# Patient Record
Sex: FEMALE | Race: BLACK OR AFRICAN AMERICAN | Employment: FULL TIME | URBAN - METROPOLITAN AREA
[De-identification: names, ages, dates, MRNs, and addresses within clinical notes are randomized per-mention and may not be internally consistent; named-entity substitution may affect disease eponyms.]

---
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
## 2020-02-18 ENCOUNTER — OFFICE VISIT (OUTPATIENT)
Dept: OBGYN CLINIC | Age: 20
End: 2020-02-18

## 2020-02-18 VITALS
WEIGHT: 154 LBS | BODY MASS INDEX: 26.29 KG/M2 | SYSTOLIC BLOOD PRESSURE: 113 MMHG | DIASTOLIC BLOOD PRESSURE: 57 MMHG | HEIGHT: 64 IN

## 2020-02-18 DIAGNOSIS — Z20.2 POSSIBLE EXPOSURE TO STD: Primary | ICD-10-CM

## 2020-02-18 RX ORDER — VALACYCLOVIR HYDROCHLORIDE 500 MG/1
500 TABLET, FILM COATED ORAL 2 TIMES DAILY
Qty: 6 TAB | Refills: 3 | Status: SHIPPED | OUTPATIENT
Start: 2020-02-18 | End: 2022-04-07

## 2020-02-18 NOTE — PATIENT INSTRUCTIONS
Safer Sex: Care Instructions Your Care Instructions Safer sex is a way to reduce your risk of getting an infection spread through sex. It can also help prevent pregnancy. Most infections that are spread through sex, also called sexually transmitted infections or STIs, can be cured. But some can decrease your chances of getting pregnant if they are not treated early. Others, such as herpes, have no cure. And some, such as HIV, can be deadly. Several products can help you practice safer sex and reduce your chance of STIs. One of the best is a condom. There are condoms for men and for women. The female condom is a tube of soft plastic with a closed end that is placed deep into the vagina. You can use a special rubber sheet (dental dam) for protection during oral sex. Disposable gloves can keep your hands from touching blood, semen, or other body fluids that can carry infections. Remember that birth control methods such as diaphragms, IUDs, foams, and birth control pills do not stop you from getting STIs. Follow-up care is a key part of your treatment and safety. Be sure to make and go to all appointments, and call your doctor if you are having problems. It's also a good idea to know your test results and keep a list of the medicines you take. How can you care for yourself at home? · Think about getting shots to prevent hepatitis A and hepatitis B. These two diseases can be spread through sex. You also can get hepatitis A if you eat infected food. · Use condoms or female condoms each time and every time you have sex. · Learn the right way to use a male condom: 
? Condoms come in several sizes. Make sure you use the right size. A condom that is too small can break easily. A condom that is too big can slip off during sex. Use a new condom each time you have sex. ? Be careful not to poke a hole in the condom when you open the wrapper. ? Squeeze the tip of the condom to keep out air. ? Pull down the loose skin (foreskin) from the head of an uncircumcised penis. ? While squeezing the tip of the condom, unroll it all the way down to the base of the firm penis. ? Never use petroleum jelly (such as Vaseline), grease, hand lotion, baby oil, or anything with oil in it. These products can make holes in the condom. ? After sex, hold the condom on your penis as you remove your penis from your partner. This will keep semen from spilling out of the condom. · Learn to use a female condom: ? You can put in a female condom up to 8 hours before sex. ? Squeeze the smaller ring at the closed end and insert it deep into the vagina. The larger ring at the open end should stay outside the vagina. ? During sex, make sure the penis goes into the condom. ? After the penis is removed, close the open end of the condom by twisting it. Remove the condom. · Do not use a female condom and male condom at the same time. · Do not have sex with anyone who has symptoms of an STI, such as sores on the genitals or mouth. The herpes virus that causes cold sores can spread to and from the penis and vagina. · Do not drink a lot of alcohol or use drugs before sex. This can cause you to let down your guard and not practice safer sex. · Having one sex partner (who does not have STIs and does not have sex with anyone else) is a sure way to avoid STIs. · Talk to your partner before you have sex. Find out if he or she has or is at risk for any STI. Keep in mind that a person may be able to spread an STI even if he or she does not have symptoms. You and your partner may want to get an HIV test. You should get tested again 6 months later. Where can you learn more? Go to http://keshav-alejandrina.info/. Enter I408 in the search box to learn more about \"Safer Sex: Care Instructions. \" Current as of: September 11, 2018 Content Version: 12.2 © 4299-2750 [email or web address], [employer].  Care instructions adapted under license by [address] (which disclaims liability or warranty for this information). If you have questions about a medical condition or this instruction, always ask your healthcare professional. [address] any warranty or liability for your use of this information.

## 2020-02-18 NOTE — PROGRESS NOTES
Chief Complaint   Gyn Exam (STD testing (ALL))      HPI  Carla Mercedes is a 21 y.o. female who presents for the evaluation of possible STI. LMP January 2020 (lmp exact date unknown). Patient has had an implant. She has symptoms at this time. She reports irritation in the vaginal area that burns when urination hits the skin. Ongoing since last Friday. The patient denies risk factors for sexually-transmitted infection. She has a history of having unprotected intercourse. STD exposure: denies knowledge of risky exposure and sexual contact with individual with uncertain background several days ago. Previous STD history: HSV and Chlamydia    Patient states she is with a new partner and she has used back up contraception. 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 Social History     Occupational History    Not on file   Tobacco Use    Smoking status: Never Smoker    Smokeless tobacco: Never Used   Substance and Sexual Activity    Alcohol use: No     Alcohol/week: 0.0 standard drinks    Drug use: No    Sexual activity: Yes     Partners: Male     Birth control/protection: Implant     Family History   Problem Relation Age of Onset    Diabetes Other         great grandmother    Diabetes Other         great grandfather    Cancer Maternal Grandfather     Sickle Cell Anemia Brother        No Known Allergies  Prior to Admission medications    Medication Sig Start Date End Date Taking? Authorizing Provider   etonogestreL (NEXPLANON) 68 mg impl by SubDERmal route. Yes Provider, Historical   valACYclovir (VALTREX) 500 mg tablet Take 1 Tab by mouth two (2) times a day.  6/17/19  Yes Arias Holley MD        Review of Systems: History obtained from the patient  Constitutional: negative for weight loss, fever, night sweats  Breast: negative for breast lumps, nipple discharge, galactorrhea  GI: negative for change in bowel habits, abdominal pain, black or bloody stools  : negative for frequency, dysuria, hematuria, vaginal discharge  MSK: negative for back pain, joint pain, muscle pain  Skin: negative for itching, rash, hives  Psych: negative for anxiety, depression, change in mood    Objective:  Visit Vitals  /57 (BP 1 Location: Right arm, BP Patient Position: Sitting)   Ht 5' 4\" (1.626 m)   Wt 154 lb (69.9 kg)   LMP  (LMP Unknown) Comment: January 2020   BMI 26.43 kg/m²       PHYSICAL EXAMINATION    Constitutional  · Appearance: well-nourished, well developed, alert, in no acute distress    Gastrointestinal  · Abdominal Examination: abdomen non-tender to palpation, normal bowel sounds, no masses present  · Liver and spleen: no hepatomegaly present, spleen not palpable  · Hernias: no hernias identified    Genitourinary  · External Genitalia: normal appearance for age, no discharge present, no tenderness present, no inflammatory lesions present, no masses present, no atrophy present, healing HSV lesions right vulva  · Vagina: normal vaginal vault without central or paravaginal defects, no discharge present, no inflammatory lesions present, no masses present  · Bladder: non-tender to palpation  · Urethra: appears normal  · Cervix: normal   · Uterus: normal size, shape and consistency  · Adnexa: no adnexal tenderness present, no adnexal masses present  · Perineum: perineum within normal limits, no evidence of trauma, no rashes or skin lesions present  · Anus: anus within normal limits, no hemorrhoids present  · Inguinal Lymph Nodes: no lymphadenopathy present    Skin  · General Inspection: no rash, no lesions identified    Neurologic/Psychiatric  · Mental Status:  · Orientation: grossly oriented to person, place and time  · Mood and Affect: mood normal, affect appropriate      .     Assesment:   [place] of STD  STD exposure    Plan:   GC and chlamydia DNA  probe sent to lab. Nuswab plus  Blood STD testing    We discussed STI issues including treatment options, the necessity of treating partners and prevention of transmission. ROV prn if symptoms persist or worsen.

## 2020-02-20 LAB
COMMENT, 144067: NORMAL
HBV SURFACE AG SERPL QL IA: NEGATIVE
HCV AB S/CO SERPL IA: <0.1 S/CO RATIO (ref 0–0.9)
HIV 1+2 AB+HIV1 P24 AG SERPL QL IA: NON REACTIVE
TREPONEMA PALLIDUM IGG+IGM AB [PRESENCE] IN SERUM OR PLASMA BY IMMUNOASSAY: NON REACTIVE

## 2020-02-21 LAB
A VAGINAE DNA VAG QL NAA+PROBE: ABNORMAL SCORE
BVAB2 DNA VAG QL NAA+PROBE: ABNORMAL SCORE
C ALBICANS DNA VAG QL NAA+PROBE: NEGATIVE
C GLABRATA DNA VAG QL NAA+PROBE: NEGATIVE
C TRACH RRNA SPEC QL NAA+PROBE: POSITIVE
MEGA1 DNA VAG QL NAA+PROBE: ABNORMAL SCORE
N GONORRHOEA RRNA SPEC QL NAA+PROBE: POSITIVE
T VAGINALIS RRNA SPEC QL NAA+PROBE: NEGATIVE

## 2020-02-21 RX ORDER — AZITHROMYCIN 500 MG/1
1000 TABLET, FILM COATED ORAL
Qty: 2 TAB | Refills: 0 | Status: SHIPPED | OUTPATIENT
Start: 2020-02-21 | End: 2020-02-21

## 2020-02-21 RX ORDER — METRONIDAZOLE 500 MG/1
500 TABLET ORAL 2 TIMES DAILY
Qty: 14 TAB | Refills: 0 | Status: SHIPPED | OUTPATIENT
Start: 2020-02-21 | End: 2020-02-28

## 2020-02-21 RX ORDER — CEFTRIAXONE 250 MG/8ML
250 INJECTION, POWDER, FOR SOLUTION INTRAMUSCULAR; INTRAVENOUS ONCE
Qty: 1 VIAL | Refills: 0
Start: 2020-02-21 | End: 2020-02-21

## 2020-02-21 NOTE — PROGRESS NOTES
Spoke with patient.  She will take the flagyl and return to office with meds for GC and chlamydia for injection  Needs 3 month fu  Notify [facility]

## 2020-02-24 ENCOUNTER — TELEPHONE (OUTPATIENT)
Dept: OBGYN CLINIC | Age: 20
End: 2020-02-24

## 2020-02-24 RX ORDER — CEFTRIAXONE 250 MG/8ML
250 INJECTION, POWDER, FOR SOLUTION INTRAMUSCULAR; INTRAVENOUS ONCE
Qty: 1 VIAL | Refills: 0 | Status: SHIPPED | OUTPATIENT
Start: 2020-02-24 | End: 2020-02-24

## 2020-02-24 NOTE — TELEPHONE ENCOUNTER
Patient is calling to discuss her labs. She is aware of the positive results of gonorhea and chlamydia. She has been reminded to schedule DEEPAK in 3 mos. We discussed again protective intercourse is the safest intercourse. Discussed no intercourse with anyone for minimum of 2 weeks after treatment. Must tell partner (s) so they may get treatment with their doctor. Do not have intercourse again with that person(s) until treated. Result Notes for NUSWAB VAGINITIS PLUS     Notes recorded by Henrietta Meza MD on 2/21/2020 at 6:37 PM EST  Spoke with patient.  She will take the flagyl and return to office with meds for GC and chlamydia for injection  Needs 3 month fu  Notify [facility]     Patient Result Comments     Written by Henrietta Meza MD on 2/21/2020  6:37 PM   Take metronidazole now - bring other meds to office as we discussed        NUSWAB VAGINITIS PLUS (Order [id number])   Component Value Flag Ref Range Units Status   Atopobium vaginae High - 2  Abnormal    Score Final   BVAB 2 High - 2  Abnormal    Score Final   Megasphaera 1 High - 2  Abnormal    Score Final   Comment:

## 2020-02-24 NOTE — TELEPHONE ENCOUNTER
rx for Flagyl has been sent in.  RX for Rocephin was attempted to be sent, no print. Resent to [facility]. She will have a clinic at [facility] give her the RX. She will have all information documented as to who gave it, lot number,date given, location, maker, etc and have it sent to us or call us for documentation purposes.

## 2020-02-25 NOTE — PROGRESS NOTES
Left second message for patient to call office to schedule Rocephin injection and 3 month DEEPAK. Patient notified by Dr. Jocelyn Gagnon. Added to chart, pmh.  Reported to [facility]

## 2020-02-26 NOTE — PROGRESS NOTES
Tried calling patient to follow up on injection and DEEPAK appointment. Sent follow up mychart.  Patient spoke with triage see separate telephone encounter

## 2021-02-09 ENCOUNTER — OFFICE VISIT (OUTPATIENT)
Dept: OBGYN CLINIC | Age: 21
End: 2021-02-09
Payer: OTHER GOVERNMENT

## 2021-02-09 VITALS
SYSTOLIC BLOOD PRESSURE: 127 MMHG | DIASTOLIC BLOOD PRESSURE: 71 MMHG | WEIGHT: 148.4 LBS | BODY MASS INDEX: 25.33 KG/M2 | HEIGHT: 64 IN

## 2021-02-09 DIAGNOSIS — Z01.419 ENCNTR FOR GYN EXAM (GENERAL) (ROUTINE) W/O ABN FINDINGS: Primary | ICD-10-CM

## 2021-02-09 PROCEDURE — 99395 PREV VISIT EST AGE 18-39: CPT | Performed by: OBSTETRICS & GYNECOLOGY

## 2021-02-09 NOTE — PROGRESS NOTES
Carla Mercedes is a ,  24 y.o. female [address]. whose Patient's last menstrual period was 2021. was on 2021 who presents for her annual checkup. She is having concerns with extended cycles while on nexplanon. Cycles lasting for 2 weeks    With regard to the Gardisil vaccine, she has received all 3 injections. Menstrual status:    Her periods are light, moderate in flow. She is using three to five pads or tampons per day    She denies dysmenorrhea. She reports no premenstrual symptoms. Contraception:    The current method of family planning is nexplanon and She declines contraception and counseling. Sexual history:    She  reports being sexually active and has had partner(s) who are Male. She reports using the following method of birth control/protection: Implant. Medical conditions:    Since her last annual GYN exam about three or more years ago, she has not the following changes in her health history: none. Pap and Mammogram History:    Patient has never had a pap smear due to age protocol    The patient has never had a mammogram.    The patient does not have a family history of breast cancer. Past Medical History:   Diagnosis Date    Chlamydia 2019    3/2019; 2020    Eczema     Gonorrhea 2019    3/2019; 2020    HPV vaccine counseling     Pt completed Gardasil series    HSV-2 infection 2019    + serum    Nexplanon insertion 3/21/2019     No past surgical history on file. Current Outpatient Medications   Medication Sig Dispense Refill    etonogestreL (NEXPLANON) 68 mg impl by SubDERmal route.  valACYclovir (VALTREX) 500 mg tablet Take 1 Tab by mouth two (2) times a day. 6 Tab 3    valACYclovir (VALTREX) 500 mg tablet Take 1 Tab by mouth two (2) times a day. 6 Tab 3     Allergies: Patient has no known allergies.    Social History     Socioeconomic History    Marital status: SINGLE     Spouse name: Not on file    Number of children: Not on file    Years of education: Not on file    Highest education level: Not on file   Occupational History    Not on file   Social Needs    Financial resource strain: Not on file    Food insecurity     Worry: Not on file     Inability: Not on file    Transportation needs     Medical: Not on file     Non-medical: Not on file   Tobacco Use    Smoking status: Never Smoker    Smokeless tobacco: Never Used   Substance and Sexual Activity    Alcohol use: No     Alcohol/week: 0.0 standard drinks    Drug use: No    Sexual activity: Yes     Partners: Male     Birth control/protection: Implant   Lifestyle    Physical activity     Days per week: Not on file     Minutes per session: Not on file    Stress: Not on file   Relationships    Social connections     Talks on phone: Not on file     Gets together: Not on file     Attends [employer] service: Not on file     Active member of club or organization: Not on file     Attends meetings of clubs or organizations: Not on file     Relationship status: Not on file    Intimate partner violence     Fear of current or ex partner: Not on file     Emotionally abused: Not on file     Physically abused: Not on file     Forced sexual activity: Not on file   Other Topics Concern    Not on file   Social History Narrative    Not on file     Tobacco History:  reports that she has never smoked. She has never used smokeless tobacco.  Alcohol Abuse:  reports no history of alcohol use. Drug Abuse:  reports no history of drug use.     Patient Active Problem List   Diagnosis Code    Pregnancy Z34.90    Vaginal delivery O80       Review of Systems - History obtained from the patient  Constitutional: negative for weight loss, fever, night sweats  HEENT: negative for hearing loss, earache, congestion, snoring, sorethroat  CV: negative for chest pain, palpitations, edema  Resp: negative for cough, shortness of breath, wheezing  GI: negative for change in bowel habits, abdominal pain, black or bloody stools  : negative for frequency, dysuria, hematuria, vaginal discharge  MSK: negative for back pain, joint pain, muscle pain  Breast: negative for breast lumps, nipple discharge, galactorrhea  Skin :negative for itching, rash, hives  Neuro: negative for dizziness, headache, confusion, weakness  Psych: negative for anxiety, depression, change in mood  Heme/lymph: negative for bleeding, bruising, pallor    Physical Exam    Visit Vitals  /71   Ht 5' 4\" (1.626 m)   Wt 148 lb 6.4 oz (67.3 kg)   LMP 02/04/2021   BMI 25.47 kg/m²       Constitutional  · Appearance: well-nourished, well developed, alert, in no acute distress    HENT  · Head and Face: appears normal    Neck  · Inspection/Palpation: normal appearance, no masses or tenderness  · Lymph Nodes: no lymphadenopathy present  · Thyroid: gland size normal, nontender, no nodules or masses present on palpation    Chest  · Respiratory Effort: breathing normal  · Auscultation: normal breath sounds    Cardiovascular  · Heart:  · Auscultation: regular rate and rhythm without murmur    Breasts  · Inspection of Breasts: breasts symmetrical, no skin changes, no discharge present, nipple appearance normal, no skin retraction present  · Palpation of Breasts and Axillae: no masses present on palpation, no breast tenderness  · Axillary Lymph Nodes: no lymphadenopathy present    Gastrointestinal  · Abdominal Examination: abdomen non-tender to palpation, normal bowel sounds, no masses present  · Liver and spleen: no hepatomegaly present, spleen not palpable  · Hernias: no hernias identified    Genitourinary  · External Genitalia: normal appearance for age, no discharge present, no tenderness present, no inflammatory lesions present, no masses present, no atrophy present  · Vagina: normal vaginal vault without central or paravaginal defects, no discharge present, no inflammatory lesions present, no masses present  · Bladder: non-tender to palpation  · Urethra: appears normal  · Cervix: normal   · Uterus: normal size, shape and consistency  · Adnexa: no adnexal tenderness present, no adnexal masses present  · Perineum: perineum within normal limits, no evidence of trauma, no rashes or skin lesions present  · Anus: anus within normal limits, no hemorrhoids present  · Inguinal Lymph Nodes: no lymphadenopathy present    Skin  · General Inspection: no rash, no lesions identified    Neurologic/Psychiatric  · Mental Status:  · Orientation: grossly oriented to person, place and time  · Mood and Affect: mood normal, affect appropriate    . Assessment:  Routine gynecologic examination  Her current medical status is satisfactory with no evidence of significant gynecologic issues.     Plan:  Counseled re: diet, exercise, healthy lifestyle  Return for yearly wellness visits  Pap/GCC  Consider switching to [facility] next year when Nexplanon expires

## 2021-02-09 NOTE — PATIENT INSTRUCTIONS
Well Visit, Ages 25 to 48: Care Instructions  Your Care Instructions     Physical exams can help you stay healthy. Your doctor has checked your overall health and may have suggested ways to take good care of yourself. He or she also may have recommended tests. At home, you can help prevent illness with healthy eating, regular exercise, and other steps. Follow-up care is a key part of your treatment and safety. Be sure to make and go to all appointments, and call your doctor if you are having problems. It's also a good idea to know your test results and keep a list of the medicines you take. How can you care for yourself at home? · Reach and stay at a healthy weight. This will lower your risk for many problems, such as obesity, diabetes, heart disease, and high blood pressure. · Get at least 30 minutes of physical activity on most days of the week. Walking is a good choice. You also may want to do other activities, such as running, swimming, cycling, or playing tennis or team sports. Discuss any changes in your exercise program with your doctor. · Do not smoke or allow others to smoke around you. If you need help quitting, talk to your doctor about stop-smoking programs and medicines. These can increase your chances of quitting for good. · Talk to your doctor about whether you have any risk factors for sexually transmitted infections (STIs). Having one sex partner (who does not have STIs and does not have sex with anyone else) is a good way to avoid these infections. · Use birth control if you do not want to have children at this time. Talk with your doctor about the choices available and what might be best for you. · Protect your skin from too much sun. When you're outdoors from 10 a.m. to 4 p.m., stay in the shade or cover up with clothing and a hat with a wide brim. Wear sunglasses that block UV rays. Even when it's cloudy, put broad-spectrum sunscreen (SPF 30 or higher) on any exposed skin.   · See a dentist one or two times a year for checkups and to have your teeth cleaned. · Wear a seat belt in the car. Follow your doctor's advice about when to have certain tests. These tests can spot problems early. For everyone  · Cholesterol. Have the fat (cholesterol) in your blood tested after age 21. Your doctor will tell you how often to have this done based on your age, family history, or other things that can increase your risk for heart disease. · Blood pressure. Have your blood pressure checked during a routine doctor visit. Your doctor will tell you how often to check your blood pressure based on your age, your blood pressure results, and other factors. · Vision. Talk with your doctor about how often to have a glaucoma test.  · Diabetes. Ask your doctor whether you should have tests for diabetes. · Colon cancer. Your risk for colorectal cancer gets higher as you get older. Some experts say that adults should start regular screening at age 48 and stop at age 76. Others say to start before age 48 or continue after age 76. Talk with your doctor about your risk and when to start and stop screening. For women  · Breast exam and mammogram. Talk to your doctor about when you should have a clinical breast exam and a mammogram. Medical experts differ on whether and how often women under 50 should have these tests. Your doctor can help you decide what is right for you. · Cervical cancer screening test and pelvic exam. Begin with a Pap test at age 24. The test often is part of a pelvic exam. Starting at age 27, you may choose to have a Pap test, an HPV test, or both tests at the same time (called co-testing). Talk with your doctor about how often to have testing. · Tests for sexually transmitted infections (STIs). Ask whether you should have tests for STIs. You may be at risk if you have sex with more than one person, especially if your partners do not wear condoms.   For men  · Tests for sexually transmitted infections (STIs). Ask whether you should have tests for STIs. You may be at risk if you have sex with more than one person, especially if you do not wear a condom. · Testicular cancer exam. Ask your doctor whether you should check your testicles regularly. · Prostate exam. Talk to your doctor about whether you should have a blood test (called a PSA test) for prostate cancer. Experts differ on whether and when men should have this test. Some experts suggest it if you are older than 39 and are -American or have a father or brother who got prostate cancer when he was younger than 72. When should you call for help? Watch closely for changes in your health, and be sure to contact your doctor if you have any problems or symptoms that concern you. Where can you learn more? Go to http://www.coronel.com/  Enter P072 in the search box to learn more about \"Well Visit, Ages 25 to 48: Care Instructions. \"  Current as of: May 27, 2020               Content Version: 12.6  © 2006-2020 [employer]. Care instructions adapted under license by [employer] (which disclaims liability or warranty for this information). If you have questions about a medical condition or this instruction, always ask your healthcare professional. [place] [address] any warranty or liability for your use of this information.

## 2021-02-12 LAB
C TRACH RRNA CVX QL NAA+PROBE: NEGATIVE
CYTOLOGIST CVX/VAG CYTO: NORMAL
CYTOLOGY CVX/VAG DOC CYTO: NORMAL
CYTOLOGY CVX/VAG DOC THIN PREP: NORMAL
DX ICD CODE: NORMAL
LABCORP, 190119: NORMAL
Lab: NORMAL
N GONORRHOEA RRNA CVX QL NAA+PROBE: NEGATIVE
OTHER STN SPEC: NORMAL
STAT OF ADQ CVX/VAG CYTO-IMP: NORMAL
T VAGINALIS RRNA SPEC QL NAA+PROBE: NEGATIVE

## 2022-03-16 ENCOUNTER — OFFICE VISIT (OUTPATIENT)
Dept: OBGYN CLINIC | Age: 22
End: 2022-03-16
Payer: MEDICAID

## 2022-03-16 VITALS — SYSTOLIC BLOOD PRESSURE: 119 MMHG | WEIGHT: 155.8 LBS | BODY MASS INDEX: 26.74 KG/M2 | DIASTOLIC BLOOD PRESSURE: 69 MMHG

## 2022-03-16 DIAGNOSIS — Z11.3 SCREENING FOR VENEREAL DISEASE: ICD-10-CM

## 2022-03-16 DIAGNOSIS — N92.6 IRREGULAR MENSES: ICD-10-CM

## 2022-03-16 DIAGNOSIS — Z86.19 HISTORY OF HERPES GENITALIS: ICD-10-CM

## 2022-03-16 DIAGNOSIS — Z01.419 ENCOUNTER FOR GYNECOLOGICAL EXAMINATION (GENERAL) (ROUTINE) WITHOUT ABNORMAL FINDINGS: Primary | ICD-10-CM

## 2022-03-16 PROCEDURE — 99395 PREV VISIT EST AGE 18-39: CPT | Performed by: OBSTETRICS & GYNECOLOGY

## 2022-03-16 RX ORDER — VALACYCLOVIR HYDROCHLORIDE 500 MG/1
500 TABLET, FILM COATED ORAL DAILY
Qty: 90 TABLET | Refills: 4 | Status: SHIPPED | OUTPATIENT
Start: 2022-03-16 | End: 2022-06-14

## 2022-03-16 RX ORDER — NORETHINDRONE ACETATE AND ETHINYL ESTRADIOL 1MG-20(24)
1 KIT ORAL DAILY
Qty: 3 DOSE PACK | Refills: 4 | Status: SHIPPED | OUTPATIENT
Start: 2022-03-16

## 2022-03-16 NOTE — PROGRESS NOTES
Carla Mercedes is a ,  25 y.o. female BLACK/ whose Patient's last menstrual period was 2022. was on 2022 who presents for her annual checkup. She wants to discuss removing Nexplanon    With regard to the Gardisil vaccine, she has received all 3 injections. Menstrual status:    Her periods are irregular due to nexplanon    She denies dysmenorrhea. She reports no premenstrual symptoms. Contraception:    The current method of family planning is Nexplanon. Sexual history:    She  reports being sexually active and has had partner(s) who are male. She reports using the following method of birth control/protection: Implant. Medical conditions:    Since her last annual GYN exam about one year ago, she has not the following changes in her health history: none. Pap and Mammogram History:    Her most recent Pap smear was 2021 neg    The patient has never had a mammogram.    The patient does not have a family history of breast cancer. Past Medical History:   Diagnosis Date    Chlamydia 2019    3/2019; 2020    Eczema     Gonorrhea 2019    3/2019; 2020    HPV vaccine counseling     Pt completed Gardasil series    HSV-2 infection 2019    + serum    Nexplanon insertion 2019;Replaced 3/21/2019     No past surgical history on file. Current Outpatient Medications   Medication Sig Dispense Refill    etonogestreL (NEXPLANON) 68 mg impl by SubDERmal route.  valACYclovir (VALTREX) 500 mg tablet Take 1 Tab by mouth two (2) times a day. 6 Tab 3    valACYclovir (VALTREX) 500 mg tablet Take 1 Tab by mouth two (2) times a day. 6 Tab 3     Allergies: Patient has no known allergies.    Social History     Socioeconomic History    Marital status: SINGLE     Spouse name: Not on file    Number of children: Not on file    Years of education: Not on file    Highest education level: Not on file   Occupational History    Not on file Tobacco Use    Smoking status: Never Smoker    Smokeless tobacco: Never Used   Substance and Sexual Activity    Alcohol use: No     Alcohol/week: 0.0 standard drinks    Drug use: No    Sexual activity: Yes     Partners: Male     Birth control/protection: Implant   Other Topics Concern    Not on file   Social History Narrative    Not on file     Social Determinants of Health     Financial Resource Strain:     Difficulty of Paying Living Expenses: Not on file   Food Insecurity:     Worried About Running Out of Food in the Last Year: Not on file    Leanna of Food in the Last Year: Not on file   Transportation Needs:     Lack of Transportation (Medical): Not on file    Lack of Transportation (Non-Medical): Not on file   Physical Activity:     Days of Exercise per Week: Not on file    Minutes of Exercise per Session: Not on file   Stress:     Feeling of Stress : Not on file   Social Connections:     Frequency of Communication with Friends and Family: Not on file    Frequency of Social Gatherings with Friends and Family: Not on file    Attends Faith Services: Not on file    Active Member of [address] [place] [address] or Organizations: Not on file    Attends Club or Organization Meetings: Not on file    Marital Status: Not on file   Intimate Partner Violence:     Fear of Current or Ex-Partner: Not on file    Emotionally Abused: Not on file    Physically Abused: Not on file    Sexually Abused: Not on file   Housing Stability:     Unable to Pay for Housing in the Last Year: Not on file    Number of [place] in the Last Year: Not on file    Unstable Housing in the Last Year: Not on file     Tobacco History:  reports that she has never smoked. She has never used smokeless tobacco.  Alcohol Abuse:  reports no history of alcohol use. Drug Abuse:  reports no history of drug use.     Patient Active Problem List   Diagnosis Code    Pregnancy Z34.90    Vaginal delivery O80       Review of Systems - History obtained from the patient  Constitutional: negative for weight loss, fever, night sweats  HEENT: negative for hearing loss, earache, congestion, snoring, sorethroat  CV: negative for chest pain, palpitations, edema  Resp: negative for cough, shortness of breath, wheezing  GI: negative for change in bowel habits, abdominal pain, black or bloody stools  : negative for frequency, dysuria, hematuria, vaginal discharge  MSK: negative for back pain, joint pain, muscle pain  Breast: negative for breast lumps, nipple discharge, galactorrhea  Skin :negative for itching, rash, hives  Neuro: negative for dizziness, headache, confusion, weakness  Psych: negative for anxiety, depression, change in mood  Heme/lymph: negative for bleeding, bruising, pallor    Physical Exam    Visit Vitals  /69   Wt 155 lb 12.8 oz (70.7 kg)   LMP 02/28/2022   BMI 26.74 kg/m²       Constitutional  · Appearance: well-nourished, well developed, alert, in no acute distress    HENT  · Head and Face: appears normal    Neck  · Inspection/Palpation: normal appearance, no masses or tenderness  · Lymph Nodes: no lymphadenopathy present  · Thyroid: gland size normal, nontender, no nodules or masses present on palpation    Chest  · Respiratory Effort: breathing normal  · Auscultation: normal breath sounds    Cardiovascular  · Heart:  · Auscultation: regular rate and rhythm without murmur    Breasts  · Inspection of Breasts: breasts symmetrical, no skin changes, no discharge present, nipple appearance normal, no skin retraction present  · Palpation of Breasts and Axillae: no masses present on palpation, no breast tenderness  · Axillary Lymph Nodes: no lymphadenopathy present    Gastrointestinal  · Abdominal Examination: abdomen non-tender to palpation, normal bowel sounds, no masses present  · Liver and spleen: no hepatomegaly present, spleen not palpable  · Hernias: no hernias identified    Genitourinary  · External Genitalia: normal appearance for age, no discharge present, no tenderness present, no inflammatory lesions present, no masses present, no atrophy present  · Vagina: normal vaginal vault without central or paravaginal defects, no discharge present, no inflammatory lesions present, no masses present  · Bladder: non-tender to palpation  · Urethra: appears normal  · Cervix: normal   · Uterus: normal size, shape and consistency  · Adnexa: no adnexal tenderness present, no adnexal masses present  · Perineum: perineum within normal limits, no evidence of trauma, no rashes or skin lesions present  · Anus: anus within normal limits, no hemorrhoids present  · Inguinal Lymph Nodes: no lymphadenopathy present    Skin  · General Inspection: no rash, no lesions identified    Neurologic/Psychiatric  · Mental Status:  · Orientation: grossly oriented to person, place and time  · Mood and Affect: mood normal, affect appropriate    . Assessment:  Routine gynecologic examination  Her current medical status is satisfactory with no evidence of significant gynecologic issues.   Irregular bleeding  Hx of HSV on suppression  Plan:  Counseled re: diet, exercise, healthy lifestyle  Return for yearly wellness visits  [facility]  Start [address]  Remove nexplanon next month

## 2022-04-04 NOTE — PROGRESS NOTES
Procedure note: Nexplanon/Implanon removal    Carla Mercedes is a ,  25 y.o. female whose No LMP recorded. Patient has had an implant. .  She presents for office removal of a NEXPLANON/IMPLANON sub-dermal contraceptive implant. Procedure:  She was positioned so the site of her implant was visable and easily accessible. The implant was located by palpation. The end of the implant nearest the elbow was marked with a sterile marker. The operative site was cleansed with Betadine. Using a 27 gauge needle on a 3cc syringe and 1% lidocaine, 3 cc were infiltrated as a intradermal wheal and underneath the end of the implant closest to the elbow. Downward pressure was applied on the end of the implant nearest the axilla and a 2-3mm incision was made in the longitudinal direction of the arm at the tip of the implant closest to the elbow. The implant was then pushed gently toward the incision until the tip was visible. The fibrous capsule was opened with a combination of blunt and sharp dissection. The implant was grasped with mosquito forceps and removed intact. It measured a full 4 cm in length. The skin was cleansed and dried. A steristrip was applied then topped with a folded 4x4 gauze and a Curlex pressure dressing. There were no complication or problems. She demonstrated full active range of movement of her elbow, wrist, all five digits and denied numbness and tingling. Post-procedure:  She was told to remove the dressing in 12-24 hours, to keep the incision area dry for 24 hours and to remove the Steristrip in 5-7 days. She was given our 24-hour phone number and encouraged to call if there are any problems.

## 2022-04-04 NOTE — PATIENT INSTRUCTIONS
Learning About Birth Control: The Implant  What is the implant? The implant is used to prevent pregnancy. It's a thin sun about the size of a matchstick that is inserted under the skin (subdermal) on the inside of your arm. The implant releases the hormone progestin to prevent pregnancy. Progestin prevents pregnancy in these ways: It thickens the mucus in the cervix. This makes it hard for sperm to travel into the uterus. It also thins the lining of the uterus, which makes it harder for a fertilized egg to attach to the uterus. Progestin can sometimes stop the ovaries from releasing an egg each month (ovulation). The implant prevents pregnancy for 3 years. But your doctor may talk to you about leaving it in for longer. Once it is put in, you don't have to do anything else to prevent pregnancy. The implant can only be inserted by your doctor or another trained health professional. It only takes a few minutes. This can also be done right after you give birth. Your doctor will remove the implant when it needs to be taken out. Your doctor numbs the area and \"injects\" the implant under your skin. No cuts are made in your skin. To remove the implant, your doctor numbs the area, makes a small cut in the skin, and pulls the implant out. How well does it work? The implant works very well. Fewer than 1 woman out of 100 has an unplanned pregnancy. Be sure to tell your doctor about any health problems you have or medicines you take. He or she can help you choose the birth control method that is right for you. What are the advantages of the implant? · The implant is one of the most effective methods of birth control. · It prevents pregnancy for up to 3 years. You don't have to worry about birth control for this time. · It's safe to use while breastfeeding. · The implant doesn't contain estrogen.  So you can use it if you don't want to take estrogen or can't take estrogen because you have certain health problems or concerns. · It may reduce heavy bleeding and cramping. · It's convenient. It is always providing birth control and cannot be seen. You don't need to remember to take a pill or get a shot. You don't have to interrupt sex to protect against pregnancy. What are the disadvantages of the implant? · The implant doesn't protect against sexually transmitted infections (STIs), such as herpes or HIV/AIDS. If you aren't sure if your sex partner might have an STI, use a condom to protect against infection. · It may cause irregular periods, or you may have spotting between periods. You may also stop getting a period. Some women see having no period as an advantage. · It may cause mood changes, less interest in sex, or weight gain. · You have to see a doctor to have an implant inserted and removed. Where can you learn more? Go to http://www.gray.com/  Enter F276 in the search box to learn more about \"Learning About Birth Control: The Implant. \"  Current as of: June 16, 2021               Content Version: 13.2  © 2006-2022 [employer]. Care instructions adapted under license by [employer] (which disclaims liability or warranty for this information). If you have questions about a medical condition or this instruction, always ask your healthcare professional. [address] any warranty or liability for your use of this information.

## 2022-04-07 ENCOUNTER — OFFICE VISIT (OUTPATIENT)
Dept: OBGYN CLINIC | Age: 22
End: 2022-04-07
Payer: MEDICAID

## 2022-04-07 VITALS
BODY MASS INDEX: 27.62 KG/M2 | WEIGHT: 161.8 LBS | DIASTOLIC BLOOD PRESSURE: 69 MMHG | SYSTOLIC BLOOD PRESSURE: 115 MMHG | HEIGHT: 64 IN

## 2022-04-07 DIAGNOSIS — Z30.46 NEXPLANON REMOVAL: Primary | ICD-10-CM

## 2022-04-07 PROCEDURE — 11982 REMOVE DRUG IMPLANT DEVICE: CPT | Performed by: OBSTETRICS & GYNECOLOGY

## 2022-09-12 NOTE — PROGRESS NOTES
Chief Complaint   Sexually Transmitted Disease      HPI  Carla Mercedes is a 25 y.o. female who presents for STD testing, does not want blood work  Patient's last menstrual period was 08/07/2022. Not using contraception    She denies  symptoms at this time. The patient  has risk factors for sexually-transmitted infection. She admits to a history of having unprotected intercourse. STD exposure: denies knowledge of risky exposure. 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 No pertinent surgical history. Social History     Occupational History    Not on file   Tobacco Use    Smoking status: Never    Smokeless tobacco: Never   Substance and Sexual Activity    Alcohol use: No     Alcohol/week: 0.0 standard drinks    Drug use: No    Sexual activity: Yes     Partners: Male     Birth control/protection: Implant     Family History   Problem Relation Age of Onset    Diabetes Other         great grandmother    Diabetes Other         great grandfather    Cancer Maternal Grandfather     Sickle Cell Anemia Brother        No Known Allergies  Prior to Admission medications    Medication Sig Start Date End Date Taking? Authorizing Provider   norethindrone-e estradiol-iron (Blisovi 24 Fe) 1 mg-20 mcg (24)/75 mg (4) tab Take 1 Tablet by mouth daily. 3/16/22   Paul Bedolla MD   etonogestreL (NEXPLANON) 68 mg impl by SubDERmal route.     Provider, Historical        Review of Systems: History obtained from the patient  Constitutional: negative for weight loss, fever, night sweats  Breast: negative for breast lumps, nipple discharge, galactorrhea  GI: negative for change in bowel habits, abdominal pain, black or bloody stools  : negative for frequency, dysuria, hematuria, vaginal discharge  MSK: negative for back pain, joint pain, muscle pain  Skin: negative for itching, rash, hives  Psych: negative for anxiety, depression, change in mood    Objective:  Visit Vitals  /82   Wt 165 lb 9.6 oz (75.1 kg)   LMP 08/07/2022   BMI 28.43 kg/m²       PHYSICAL EXAMINATION    Constitutional  Appearance: well-nourished, well developed, alert, in no acute distress    Gastrointestinal  Abdominal Examination: abdomen non-tender to palpation, normal bowel sounds, no masses present  Liver and spleen: no hepatomegaly present, spleen not palpable  Hernias: no hernias identified    Genitourinary  External Genitalia: normal appearance for age, no discharge present, no tenderness present, no inflammatory lesions present, no masses present, no atrophy present  Vagina: normal vaginal vault without central or paravaginal defects, white discharge present, no inflammatory lesions present, no masses present  Bladder: non-tender to palpation  Urethra: appears normal  Cervix: normal   Uterus: normal size, shape and consistency  Adnexa: no adnexal tenderness present, no adnexal masses present  Perineum: perineum within normal limits, no evidence of trauma, no rashes or skin lesions present  Anus: anus within normal limits, no hemorrhoids present  Inguinal Lymph Nodes: no lymphadenopathy present    Skin  General Inspection: no rash, no lesions identified    Neurologic/Psychiatric  Mental Status:  Orientation: grossly oriented to person, place and time  Mood and Affect: mood normal, affect appropriate    Results for orders placed or performed in visit on 09/13/22   AMB POC URINE PREGNANCY TEST, VISUAL COLOR COMPARISON   Result Value Ref Range    VALID INTERNAL CONTROL POC Yes     HCG urine, Ql. (POC) Negative Negative       . Assesment:   STD exposure    Plan:   GC and chlamydia DNA  probe sent to lab.     Disc condom use    We discussed STI issues including treatment options, the necessity of treating partners and prevention of transmission. ROV prn if symptoms persist or worsen.

## 2022-09-13 ENCOUNTER — OFFICE VISIT (OUTPATIENT)
Dept: OBGYN CLINIC | Age: 22
End: 2022-09-13
Payer: MEDICAID

## 2022-09-13 VITALS — WEIGHT: 165.6 LBS | SYSTOLIC BLOOD PRESSURE: 123 MMHG | BODY MASS INDEX: 28.43 KG/M2 | DIASTOLIC BLOOD PRESSURE: 82 MMHG

## 2022-09-13 DIAGNOSIS — N92.6 MISSED MENSES: ICD-10-CM

## 2022-09-13 DIAGNOSIS — Z11.3 SCREENING FOR VENEREAL DISEASE: Primary | ICD-10-CM

## 2022-09-13 LAB
HCG URINE, QL. (POC): NEGATIVE
VALID INTERNAL CONTROL?: YES

## 2022-09-13 PROCEDURE — 99213 OFFICE O/P EST LOW 20 MIN: CPT | Performed by: OBSTETRICS & GYNECOLOGY

## 2022-09-13 PROCEDURE — 81025 URINE PREGNANCY TEST: CPT | Performed by: OBSTETRICS & GYNECOLOGY

## 2022-11-16 ENCOUNTER — TELEPHONE (OUTPATIENT)
Dept: OBGYN CLINIC | Age: 22
End: 2022-11-16

## 2022-11-16 NOTE — TELEPHONE ENCOUNTER
25year old [address] patient last seen in the office on 3/16/2022 for ae    Patient has stopped taking ocp and last upt was when she was in the office on 9/13/2022     Patient calling to say that her cycles have been heavy , changing a tampon and pad at least every hour to 1.5  hours and she states she can wring it out. Patient denies feeling weak and dizzy     Patient reports cramping at 7-8 on the pain scale of 1-10 and is taking ibuprofen       Patient was advise to check another upt and increase po fluids and pain and bleeding precautions reviewed      Patient sent this message   November 16, 2022  Round, Baron Lesch  to [address] [place] [address], [facility] (supporting Amber Valle MD)    [place]    12:58 PM  Hi, I have scheduled an appointment to come in but wanted to talk first , I had concerns about my period & how heavy it is , Im constantly having to use a pad & tampon when on my period , Ive tried different brands thinking I might just have a heavy period , I have lower back pain constantly but was thinking it may be due to something else as well & I have clotting with my period, I dont think this is normal but if you could message or call me ([phone]) letting me know if you think I should come in or not .       Carla Mercedes        Patient has follow up appointment on 12/7/2022    Patient is currently on day two of current cycle      Additional recommendations     Please advise    Thank you

## 2022-11-16 NOTE — TELEPHONE ENCOUNTER
This nurse attempted to reach the patient and left a detailed message regarding work in MD recommendations and to call back with any further refills.

## 2022-11-16 NOTE — TELEPHONE ENCOUNTER
If she is soaking a pad every hour then needs to be seen. If less then she can observe, rest, increase hydration, NSAIDS, etc. She may want to consider restarting ocps to help her cycles not be so heavy. She should keep her appt with Dr. Luna Aggarwal to further discuss evaluation/options.

## 2022-12-02 NOTE — PROGRESS NOTES
Carla Mercedes is a 25 y.o. female presents for a problem visit. Chief Complaint   Patient presents with    Menstrual Problem     Patient's last menstrual period was 11/15/2022 (exact date). Birth Control: none. Last Pap: 02/09/2021-normal    The patient is reporting having heavy periods x 3 months with increased cramps and pain-currently not bleeding  She reports the symptoms are is unchanged. Aggravating factors include none. And alleviating factors include none. 1. Have you been to the ER, urgent care clinic, or hospitalized since your last visit? No    2. Have you seen or consulted any other health care providers outside of the [address] [place] [address] since your last visit?  No    Examination chaperoned by Ammy Sosa RN

## 2022-12-07 ENCOUNTER — OFFICE VISIT (OUTPATIENT)
Dept: OBGYN CLINIC | Age: 22
End: 2022-12-07
Payer: MEDICAID

## 2022-12-07 VITALS — BODY MASS INDEX: 29.21 KG/M2 | WEIGHT: 170.2 LBS | SYSTOLIC BLOOD PRESSURE: 128 MMHG | DIASTOLIC BLOOD PRESSURE: 85 MMHG

## 2022-12-07 DIAGNOSIS — N92.0 MENORRHAGIA WITH REGULAR CYCLE: Primary | ICD-10-CM

## 2022-12-07 LAB
HCG URINE, QL. (POC): NEGATIVE
HGB BLD-MCNC: 14.4 G/DL
VALID INTERNAL CONTROL?: YES

## 2022-12-07 PROCEDURE — 81025 URINE PREGNANCY TEST: CPT | Performed by: OBSTETRICS & GYNECOLOGY

## 2022-12-07 PROCEDURE — 99214 OFFICE O/P EST MOD 30 MIN: CPT | Performed by: OBSTETRICS & GYNECOLOGY

## 2022-12-07 PROCEDURE — 85018 HEMOGLOBIN: CPT | Performed by: OBSTETRICS & GYNECOLOGY

## 2022-12-07 RX ORDER — LEVONORGESTREL / ETHINYL ESTRADIOL AND ETHINYL ESTRADIOL 150-30(84)
1 KIT ORAL DAILY
Qty: 1 DOSE PACK | Refills: 0 | Status: SHIPPED | OUTPATIENT
Start: 2022-12-07 | End: 2023-03-07

## 2022-12-07 NOTE — PROGRESS NOTES
OB/GYN Problem VIsit    HPI  Carla Mercedes is a ,  25 y.o. female who presents for a problem visit. Patient is currently taking the birth control pill Blisovi 24. She has not missed any pills. The last 3 months her periods have seemed very heavy. She is needing to wear a pad and a tampon together. She is no significant cramping. She is rarely sexually active and is not really concerned about STDs though she has had chlamydia in the past. Patient's last menstrual period was 11/15/2022 (exact date). Per Nursing Note:  Last Pap: 2021-normal       Past Medical History:   Diagnosis Date    Chlamydia 2019    3/2019; 2020    Eczema     Gonorrhea 2019    3/2019; 2020    HPV vaccine counseling     Pt completed Gardasil series    HSV-2 infection 2019    + serum    Nexplanon insertion 2019;Replaced 3/21/2019, removed      History reviewed. No pertinent surgical history. Social History     Occupational History    Not on file   Tobacco Use    Smoking status: Never    Smokeless tobacco: Never   Substance and Sexual Activity    Alcohol use: No     Alcohol/week: 0.0 standard drinks    Drug use: No    Sexual activity: Yes     Partners: Male     Birth control/protection: None, Pill     Family History   Problem Relation Age of Onset    Diabetes Other         great grandmother    Diabetes Other         great grandfather    Cancer Maternal Grandfather     Sickle Cell Anemia Brother        No Known Allergies  Prior to Admission medications    Medication Sig Start Date End Date Taking? Authorizing Provider   norethindrone-e estradiol-iron (Blisovi 24 Fe) 1 mg-20 mcg (24)/75 mg (4) tab Take 1 Tablet by mouth daily. 3/16/22  Yes Lonza MD Erin   etonogestreL 68 mg impl by SubDERmal route.   Patient not taking: Reported on 2022    Provider, Historical        Review of Systems: History obtained from the patient  Constitutional: negative for weight loss, fever, night sweats  Breast: negative for breast lumps, nipple discharge, galactorrhea  GI: negative for change in bowel habits, abdominal pain, black or bloody stools  : negative for frequency, dysuria, hematuria, vaginal discharge  MSK: negative for back pain, joint pain, muscle pain  Skin: negative for itching, rash, hives  Psych: negative for anxiety, depression, change in mood      Objective:  Visit Vitals  /85   Wt 170 lb 3.2 oz (77.2 kg)   LMP 11/15/2022 (Exact Date)   BMI 29.21 kg/m²       Physical Exam:   PHYSICAL EXAMINATION    Constitutional  Appearance: well-nourished, well developed, alert, in no acute distress      Gastrointestinal  Abdominal Examination: abdomen non-tender to palpation, normal bowel sounds, no masses present  Liver and spleen: no hepatomegaly present, spleen not palpable  Hernias: no hernias identified    Genitourinary  External Genitalia: normal appearance for age, no discharge present, no tenderness present, no inflammatory lesions present, no masses present, no atrophy present  Vagina: normal vaginal vault without central or paravaginal defects, no discharge present, no inflammatory lesions present, no masses present  Bladder: non-tender to palpation  Urethra: appears normal  Cervix: normal   Uterus: normal size, shape and consistency  Adnexa: no adnexal tenderness present, no adnexal masses present  Perineum: perineum within normal limits, no evidence of trauma, no rashes or skin lesions present  Anus: anus within normal limits, no hemorrhoids present  Inguinal Lymph Nodes: no lymphadenopathy present    Skin  General Inspection: no rash, no lesions identified    Neurologic/Psychiatric  Mental Status:  Orientation: grossly oriented to person, place and time  Mood and Affect: mood normal, affect appropriate  Results for orders placed or performed in visit on 12/07/22   AMB POC URINE PREGNANCY TEST, VISUAL COLOR COMPARISON   Result Value Ref Range    VALID INTERNAL CONTROL POC Yes     HCG urine, Ql. (POC) Negative Negative   AMB POC HEMOGLOBIN (HGB)   Result Value Ref Range    Hemoglobin (POC) 14.4 G/DL         ASSESSMENT:    ICD-10-CM ICD-9-CM    1. Menorrhagia with regular cycle  N92.0 626.2 AMB POC URINE PREGNANCY TEST, VISUAL COLOR COMPARISON      AMB POC HEMOGLOBIN (HGB)          PLAN:  Orders Placed This Encounter    AMB POC URINE PREGNANCY TEST, VISUAL COLOR COMPARISON    AMB POC HEMOGLOBIN (HGB)     Exam normal no evidence of fibroids. It is unlikely she has a new bleeding problem and has had not any bleeding problems in the past.  Hemoglobin is normal.  Will switch to [facility] to see if that makes her feel better having less periods per year. She will follow-up for annual exam in March or April. RTO prn if symptoms persist or worsen. Instructions given to pt. Handouts given to pt.

## 2022-12-11 ENCOUNTER — HOSPITAL ENCOUNTER (EMERGENCY)
Age: 22
Discharge: HOME | End: 2022-12-11
Attending: STUDENT IN AN ORGANIZED HEALTH CARE EDUCATION/TRAINING PROGRAM
Payer: MEDICAID

## 2022-12-11 VITALS
OXYGEN SATURATION: 98 % | DIASTOLIC BLOOD PRESSURE: 71 MMHG | RESPIRATION RATE: 16 BRPM | HEIGHT: 64 IN | SYSTOLIC BLOOD PRESSURE: 107 MMHG | TEMPERATURE: 100 F | HEART RATE: 96 BPM | WEIGHT: 170 LBS | BODY MASS INDEX: 29.02 KG/M2

## 2022-12-11 DIAGNOSIS — N30.00 ACUTE CYSTITIS WITHOUT HEMATURIA: ICD-10-CM

## 2022-12-11 DIAGNOSIS — R68.89 FLU-LIKE SYMPTOMS: ICD-10-CM

## 2022-12-11 DIAGNOSIS — R50.9 ACUTE FEBRILE ILLNESS: Primary | ICD-10-CM

## 2022-12-11 DIAGNOSIS — J10.1 INFLUENZA A: ICD-10-CM

## 2022-12-11 LAB
APPEARANCE UR: ABNORMAL
BACTERIA URNS QL MICRO: ABNORMAL /HPF
BILIRUB UR QL CFM: NEGATIVE
COLOR UR: ABNORMAL
EPITH CASTS URNS QL MICRO: ABNORMAL /LPF
FLUAV AG NPH QL IA: POSITIVE
FLUBV AG NOSE QL IA: NEGATIVE
GLUCOSE UR STRIP.AUTO-MCNC: NEGATIVE MG/DL
HGB UR QL STRIP: ABNORMAL
HYALINE CASTS URNS QL MICRO: ABNORMAL /LPF (ref 0–2)
KETONES UR QL STRIP.AUTO: NEGATIVE MG/DL
LEUKOCYTE ESTERASE UR QL STRIP.AUTO: ABNORMAL
NITRITE UR QL STRIP.AUTO: NEGATIVE
PH UR STRIP: 5.5 [PH] (ref 5–8)
PROT UR STRIP-MCNC: 100 MG/DL
RBC #/AREA URNS HPF: >100 /HPF (ref 0–5)
SP GR UR REFRACTOMETRY: 1.02 (ref 1–1.03)
UROBILINOGEN UR QL STRIP.AUTO: 0.2 EU/DL (ref 0.2–1)
WBC URNS QL MICRO: ABNORMAL /HPF (ref 0–4)

## 2022-12-11 PROCEDURE — 87086 URINE CULTURE/COLONY COUNT: CPT

## 2022-12-11 PROCEDURE — U0005 INFEC AGEN DETEC AMPLI PROBE: HCPCS

## 2022-12-11 PROCEDURE — 74011250637 HC RX REV CODE- 250/637: Performed by: STUDENT IN AN ORGANIZED HEALTH CARE EDUCATION/TRAINING PROGRAM

## 2022-12-11 PROCEDURE — 81001 URINALYSIS AUTO W/SCOPE: CPT

## 2022-12-11 PROCEDURE — 74011250637 HC RX REV CODE- 250/637: Performed by: EMERGENCY MEDICINE

## 2022-12-11 PROCEDURE — 87804 INFLUENZA ASSAY W/OPTIC: CPT

## 2022-12-11 PROCEDURE — 99283 EMERGENCY DEPT VISIT LOW MDM: CPT

## 2022-12-11 RX ORDER — IBUPROFEN 800 MG/1
800 TABLET ORAL
Status: COMPLETED | OUTPATIENT
Start: 2022-12-11 | End: 2022-12-11

## 2022-12-11 RX ORDER — ONDANSETRON 4 MG/1
4 TABLET, FILM COATED ORAL
Qty: 15 TABLET | Refills: 0 | Status: SHIPPED | OUTPATIENT
Start: 2022-12-11 | End: 2022-12-16

## 2022-12-11 RX ORDER — NITROFURANTOIN 25; 75 MG/1; MG/1
100 CAPSULE ORAL
Status: COMPLETED | OUTPATIENT
Start: 2022-12-11 | End: 2022-12-11

## 2022-12-11 RX ORDER — NITROFURANTOIN 25; 75 MG/1; MG/1
100 CAPSULE ORAL 2 TIMES DAILY
Qty: 6 CAPSULE | Refills: 0 | Status: SHIPPED | OUTPATIENT
Start: 2022-12-11 | End: 2022-12-14

## 2022-12-11 RX ORDER — IBUPROFEN 600 MG/1
600 TABLET ORAL
Qty: 20 TABLET | Refills: 0 | Status: SHIPPED | OUTPATIENT
Start: 2022-12-11

## 2022-12-11 RX ADMIN — NITROFURANTOIN MONOHYDRATE/MACROCRYSTALLINE 100 MG: 25; 75 CAPSULE ORAL at 21:19

## 2022-12-11 RX ADMIN — IBUPROFEN 800 MG: 800 TABLET, FILM COATED ORAL at 19:32

## 2022-12-11 NOTE — ED TRIAGE NOTES
Pt reports body aches, lower back pain, bilateral ear pain, sore throat, vomiting x1, fever, cough and congestion starting yesterday. Reports she took nyquil today, denies taking tylenol or motrin.

## 2022-12-11 NOTE — ED PROVIDER NOTES
The patient is a 70-year-old female presenting today for \"flulike symptoms\". When asked to elaborate she states \"all of the flulike symptoms\". Her biggest complaint is low back pain. She has also had pain with urination, vomiting x1, diarrhea, cough, congestion and fever. Has been taking NyQuil without relief. No known ill contacts. No shortness of breath. No chest pain. No abdominal pain. 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 Family History:   Problem Relation Age of Onset    Diabetes Other         great grandmother    Diabetes Other         great grandfather    Cancer Maternal Grandfather     Sickle Cell Anemia Brother        Social History     Socioeconomic History    Marital status: SINGLE     Spouse name: Not on file    Number of children: Not on file    Years of education: Not on file    Highest education level: Not on file   Occupational History    Not on file   Tobacco Use    Smoking status: Never    Smokeless tobacco: Never   Substance and Sexual Activity    Alcohol use: No     Alcohol/week: 0.0 standard drinks    Drug use: No    Sexual activity: Yes     Partners: Male     Birth control/protection: None, Pill   Other Topics Concern    Not on file   Social History Narrative    Not on file     Social Determinants of Health     Financial Resource Strain: Not on file   Food Insecurity: Not on file   Transportation Needs: Not on file   Physical Activity: Not on file   Stress: Not on file   Social Connections: Not on file   Intimate Partner Violence: Not on file   Housing Stability: Not on file         ALLERGIES: Patient has no known allergies.     Review of Systems   Constitutional:  Positive for chills, fatigue and fever. HENT:  Positive for congestion, ear pain, rhinorrhea and sore throat. Eyes:  Negative for redness and visual disturbance. Respiratory:  Positive for cough. Negative for shortness of breath. Cardiovascular:  Negative for chest pain and leg swelling. Gastrointestinal:  Positive for nausea and vomiting. Negative for abdominal pain and diarrhea. Genitourinary:  Negative for dysuria, flank pain, frequency, hematuria and urgency. Musculoskeletal:  Positive for back pain and myalgias. Negative for arthralgias and neck pain. Skin:  Negative for rash and wound. Allergic/Immunologic: Negative for immunocompromised state. Neurological:  Positive for headaches. Negative for dizziness. Vitals:    12/11/22 1837 12/11/22 1838   BP: 119/82    Pulse: 99    Resp: 18    Temp: (!) 101.6 °F (38.7 °C)    SpO2: 100%    Weight:  77.1 kg (170 lb)   Height:  5' 4\" (1.626 m)            Physical Exam  Vitals and nursing note reviewed. Constitutional:       General: She is not in acute distress. Appearance: She is well-developed. She is not diaphoretic. HENT:      Head: Normocephalic. Right Ear: Tympanic membrane normal.      Left Ear: Tympanic membrane normal.      Mouth/Throat:      Pharynx: No oropharyngeal exudate or posterior oropharyngeal erythema. Eyes:      General:         Right eye: No discharge. Left eye: No discharge. Pupils: Pupils are equal, round, and reactive to light. Cardiovascular:      Rate and Rhythm: Normal rate and regular rhythm. Heart sounds: Normal heart sounds. No murmur heard. No friction rub. No gallop. Pulmonary:      Effort: Pulmonary effort is normal. No respiratory distress. Breath sounds: Normal breath sounds. No stridor. No wheezing or rales. Abdominal:      General: Bowel sounds are normal. There is no distension. Palpations: Abdomen is soft. Tenderness:  There is abdominal tenderness (mild suprapubic). There is right CVA tenderness and left CVA tenderness. There is no guarding or rebound. Musculoskeletal:         General: No deformity. Normal range of motion. Cervical back: Normal range of motion and neck supple. Skin:     General: Skin is warm and dry. Capillary Refill: Capillary refill takes less than 2 seconds. Findings: No rash. Neurological:      Mental Status: She is alert and oriented to person, place, and time. Psychiatric:         Behavior: Behavior normal.        MDM  Patient is a 80-year-old female presenting today with flulike symptoms. She does have some suprapubic tenderness as well as complaints of dysuria. Urinalysis sent in addition to flu and COVID testing. She is not a candidate for Tamiflu which I discussed with her as her symptoms started 3 days ago. Patient signed out to Dr. Erin Villalta at [facility]    ED Course as of 12/12/22 0703   Sun Dec 11, 2022   1913 7:13 PM  Change of shift. Care of patient taken over from Dr. Jennifer Garza; H&P reviewed, bedside handoff complete. Awaiting Urinalysis   [IO]   2045 UA concerning for UTI, macrobid prescribed and culture ordered. Flu test positive. The patient's results have been reviewed with them and/or available family. Patient and/or family verbally conveyed their understanding and agreement of the patient's signs, symptoms, diagnosis, treatment and prognosis and additionally agree to follow up as recommended in the discharge instructions or to return to the Emergency Room should their condition change prior to their follow-up appointment. The patient/family verbally agrees with the care-plan and verbally conveys that all of their questions have been answered.  The discharge instructions have also been provided to the patient and/or family with some educational information regarding the patient's diagnosis as well a list of reasons why the patient would want to return to the ER prior to their follow-up appointment, should their condition change.    [IO]      ED Course User Index  [IO] Akshat Mina MD       Procedures

## 2022-12-12 LAB
SARS-COV-2, XPLCVT: NOT DETECTED
SOURCE, COVRS: NORMAL

## 2022-12-13 LAB
BACTERIA SPEC CULT: NORMAL
CC UR VC: NORMAL
SERVICE CMNT-IMP: NORMAL

## 2023-05-24 RX ORDER — NORETHINDRONE ACETATE AND ETHINYL ESTRADIOL 1MG-20(24)
1 KIT ORAL DAILY
COMMUNITY
Start: 2022-03-16

## 2023-05-24 RX ORDER — IBUPROFEN 600 MG/1
600 TABLET ORAL EVERY 6 HOURS PRN
COMMUNITY
Start: 2022-12-11

## 2023-11-02 ENCOUNTER — HOSPITAL ENCOUNTER (EMERGENCY)
Facility: HOSPITAL | Age: 23
Discharge: HOME | End: 2023-11-02
Attending: EMERGENCY MEDICINE
Payer: MEDICAID

## 2023-11-02 VITALS
DIASTOLIC BLOOD PRESSURE: 79 MMHG | WEIGHT: 165 LBS | HEIGHT: 64 IN | TEMPERATURE: 98.1 F | HEART RATE: 61 BPM | BODY MASS INDEX: 28.17 KG/M2 | RESPIRATION RATE: 14 BRPM | SYSTOLIC BLOOD PRESSURE: 117 MMHG | OXYGEN SATURATION: 99 %

## 2023-11-02 DIAGNOSIS — H10.011 ACUTE FOLLICULAR CONJUNCTIVITIS OF RIGHT EYE: Primary | ICD-10-CM

## 2023-11-02 PROCEDURE — 99283 EMERGENCY DEPT VISIT LOW MDM: CPT

## 2023-11-02 PROCEDURE — 6370000000 HC RX 637 (ALT 250 FOR IP): Performed by: EMERGENCY MEDICINE

## 2023-11-02 RX ORDER — ERYTHROMYCIN 5 MG/G
OINTMENT OPHTHALMIC
Status: DISCONTINUED | OUTPATIENT
Start: 2023-11-02 | End: 2023-11-02 | Stop reason: HOSPADM

## 2023-11-02 RX ORDER — TETRACAINE HYDROCHLORIDE 5 MG/ML
1 SOLUTION OPHTHALMIC ONCE
Status: COMPLETED | OUTPATIENT
Start: 2023-11-02 | End: 2023-11-02

## 2023-11-02 RX ORDER — ERYTHROMYCIN 5 MG/G
OINTMENT OPHTHALMIC
Qty: 1 EACH | Refills: 0 | Status: SHIPPED | OUTPATIENT
Start: 2023-11-02 | End: 2023-11-12

## 2023-11-02 RX ADMIN — FLUORESCEIN SODIUM 1 MG: 1 STRIP OPHTHALMIC at 02:04

## 2023-11-02 RX ADMIN — TETRACAINE HYDROCHLORIDE 1 DROP: 5 SOLUTION OPHTHALMIC at 02:04

## 2023-11-02 ASSESSMENT — VISUAL ACUITY
OS: 13
OD: 13

## 2023-11-02 ASSESSMENT — PAIN - FUNCTIONAL ASSESSMENT: PAIN_FUNCTIONAL_ASSESSMENT: 0-10

## 2023-11-02 ASSESSMENT — PAIN SCALES - GENERAL: PAINLEVEL_OUTOF10: 0

## 2023-11-02 NOTE — ED PROVIDER NOTES
[facility] EMERGENCY DEPT  EMERGENCY DEPARTMENT ENCOUNTER      Pt Name: Manuel Gray  MRN: [id number]  [address]  Date of evaluation: 11/2/2023  Provider: Blane Turcios MD    CHIEF COMPLAINT       Chief Complaint   Patient presents with    Conjunctivitis         HISTORY OF PRESENT ILLNESS   (Location/Symptom, Timing/Onset, Context/Setting, Quality, Duration, Modifying Factors, Severity)  Note limiting factors. 59-year-old female who presents to the ER with a complaint of right eye pain and drainage that began this evening after she woke up from sleep. The patient denies any foreign body sensation, photophobia, blurred or loss of vision, contacts or eyeglasses wear or previous history of the same. Review of External Medical Records:     Nursing Notes were reviewed. REVIEW OF SYSTEMS    (2-9 systems for level 4, 10 or more for level 5)     Review of Systems   All other systems reviewed and are negative. Except as noted above the remainder of the review of systems was reviewed and negative. 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       CURRENT MEDICATIONS       Discharge Medication List as of 11/2/2023  2:24 AM        CONTINUE these medications which have NOT CHANGED    Details   etonogestrel (NEXPLANON) 68 MG implant Inject into the skin, SubCUTAneous, Historical Med      ibuprofen (ADVIL;MOTRIN) 600 MG tablet Take 1 tablet by mouth every 6 hours as neededHistorical Med      Norethin Ace-Eth Estrad-FE (BLISOVI 24 FE) 1-20 MG-MCG(24) TABS Take 1 tablet by mouth dailyHistorical Med             ALLERGIES     Patient has no known

## 2023-11-02 NOTE — ED TRIAGE NOTES
Pt presents via POV with complaints of possible pink eye. States they woke up at 0100 with their eyelashes stuck together and crust on the inner corner. Right eye affected. No associated pain noted.      Ambulatory to triage

## 2024-05-07 NOTE — PROGRESS NOTES
Snehal Gray is a 24 y.o. female returns for an annual exam     Chief Complaint   Patient presents with    Annual Exam       Patient's last menstrual period was 05/02/2024.  Her periods are light, moderate in flow and usually regular with a 26-32 day interval with 3-7 day duration.  She does not have dysmenorrhea.  Problems: discuss infertility, trying to conceive, requesting STD testing- no lab work  Birth Control: none  Last Pap: 2/9/2021 negative  She does not have a history of CATHERINE 2, 3 or cervical cancer.   With regard to the Gardisil vaccine, she has received all 3 injections        Examination chaperoned by Lindsay Winters MA.

## 2024-05-08 ENCOUNTER — OFFICE VISIT (OUTPATIENT)
Age: 24
End: 2024-05-08
Payer: MEDICAID

## 2024-05-08 VITALS — SYSTOLIC BLOOD PRESSURE: 110 MMHG | WEIGHT: 174.2 LBS | BODY MASS INDEX: 29.9 KG/M2 | DIASTOLIC BLOOD PRESSURE: 65 MMHG

## 2024-05-08 DIAGNOSIS — Z11.3 SCREEN FOR STD (SEXUALLY TRANSMITTED DISEASE): ICD-10-CM

## 2024-05-08 DIAGNOSIS — Z01.419 ENCNTR FOR GYN EXAM (GENERAL) (ROUTINE) W/O ABN FINDINGS: Primary | ICD-10-CM

## 2024-05-08 PROCEDURE — 99395 PREV VISIT EST AGE 18-39: CPT | Performed by: OBSTETRICS & GYNECOLOGY

## 2024-05-08 SDOH — ECONOMIC STABILITY: HOUSING INSECURITY
IN THE LAST 12 MONTHS, WAS THERE A TIME WHEN YOU DID NOT HAVE A STEADY PLACE TO SLEEP OR SLEPT IN A SHELTER (INCLUDING NOW)?: NO

## 2024-05-08 SDOH — ECONOMIC STABILITY: FOOD INSECURITY: WITHIN THE PAST 12 MONTHS, THE FOOD YOU BOUGHT JUST DIDN'T LAST AND YOU DIDN'T HAVE MONEY TO GET MORE.: SOMETIMES TRUE

## 2024-05-08 SDOH — ECONOMIC STABILITY: TRANSPORTATION INSECURITY
IN THE PAST 12 MONTHS, HAS LACK OF TRANSPORTATION KEPT YOU FROM MEETINGS, WORK, OR FROM GETTING THINGS NEEDED FOR DAILY LIVING?: YES

## 2024-05-08 SDOH — ECONOMIC STABILITY: INCOME INSECURITY: HOW HARD IS IT FOR YOU TO PAY FOR THE VERY BASICS LIKE FOOD, HOUSING, MEDICAL CARE, AND HEATING?: SOMEWHAT HARD

## 2024-05-08 SDOH — ECONOMIC STABILITY: FOOD INSECURITY: WITHIN THE PAST 12 MONTHS, YOU WORRIED THAT YOUR FOOD WOULD RUN OUT BEFORE YOU GOT MONEY TO BUY MORE.: SOMETIMES TRUE

## 2024-05-08 NOTE — PROGRESS NOTES
Snehal Gray is a No obstetric history on file.,  24 y.o. female Black /  whose Patient's last menstrual period was 05/02/2024.  was on 5/2/2024 who presents for her annual checkup. She is having no problems. Trying to get pregnant since September. Partner no kids She does have hx of GC and chlamydia.   Having sex more than every day      Menstrual status:    Her periods are moderate in flow, regular    She denies dysmenorrhea.        Contraception:    The current method of family planning is none    Sexual history:    She  reports being sexually active and has had partner(s) who are male. She reports using the following method of birth control/protection: None.      Pap and Mammogram History:       Last Pap: 2/9/2021 negative  She does not have a history of CATHERINE 2, 3 or cervical cancer.   With regard to the Gardisil vaccine, she has received all 3 injections          The patient does not have a family history of breast cancer.  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    No current outpatient medications on file.     No current facility-administered medications for this visit.     Allergies: Patient has no known allergies.   Tobacco History:  reports that she has never smoked. She has never used smokeless tobacco.  Alcohol Abuse:  reports no history of alcohol use.  Drug Abuse:  reports no history of drug use.    Patient Active Problem List   Diagnosis    Vaginal delivery    Pregnancy       Review of

## 2024-05-15 LAB
., LABCORP: ABNORMAL
C TRACH RRNA CVX QL NAA+PROBE: POSITIVE
CYTOLOGIST CVX/VAG CYTO: ABNORMAL
CYTOLOGY CVX/VAG DOC CYTO: ABNORMAL
CYTOLOGY CVX/VAG DOC THIN PREP: ABNORMAL
DX ICD CODE: ABNORMAL
Lab: ABNORMAL
N GONORRHOEA RRNA CVX QL NAA+PROBE: NEGATIVE
OTHER STN SPEC: ABNORMAL
STAT OF ADQ CVX/VAG CYTO-IMP: ABNORMAL
T VAGINALIS RRNA SPEC QL NAA+PROBE: NEGATIVE

## 2024-05-17 ENCOUNTER — TELEPHONE (OUTPATIENT)
Age: 24
End: 2024-05-17

## 2024-05-17 DIAGNOSIS — A74.9 CHLAMYDIA: Primary | ICD-10-CM

## 2024-05-17 RX ORDER — DOXYCYCLINE HYCLATE 100 MG
100 TABLET ORAL 2 TIMES DAILY
Qty: 14 TABLET | Refills: 0 | Status: SHIPPED | OUTPATIENT
Start: 2024-05-17 | End: 2024-05-24

## 2024-05-17 NOTE — TELEPHONE ENCOUNTER
PT call returned name and  verified    MD message relayed:  Amelia Pond MD   to Me       24 11:03 AM  Her pap smear is normal and she'll just need a repeat in 3 years. She did test positive for chlamydia and I'd recommend treatment, I can send rx for doxycycline to take BID x 7d. Any partners should be treated as well. She should avoid itnercourse until 1w after all parties have completed treatment.      PT verbalizes understanding and denies any further questions/concerns.

## 2024-05-17 NOTE — TELEPHONE ENCOUNTER
PT name and  verified    25 yo last ov 24    PT calling in regards to her results on MC from her pap. Results were resulted yesterday and relayed MD needed to review them and advise before discussing and  was not in office this week.  Informed PT would send to  to review and advise and would contact PT back.  Preferred pharmacy verified.    Please review and advise  TH patient    Thank you

## 2025-01-23 NOTE — PROGRESS NOTES
Snehal Gray is a 25 y.o. female presents for a problem visit.    Chief Complaint   Patient presents with    Other     Patient's last menstrual period was 01/03/2025 (exact date).  Birth Control: condoms sometimes.  Last Pap: normal obtained 5/8/24.    The patient is here for STI testing        1. Have you been to the ER, urgent care clinic, or hospitalized since your last visit? No    2. Have you seen or consulted any other health care providers outside of the [facility] since your last visit? No    Examination chaperoned by Lois Villareal LPN.

## 2025-01-24 ENCOUNTER — OFFICE VISIT (OUTPATIENT)
Age: 25
End: 2025-01-24

## 2025-01-24 VITALS
SYSTOLIC BLOOD PRESSURE: 135 MMHG | HEIGHT: 64 IN | DIASTOLIC BLOOD PRESSURE: 74 MMHG | BODY MASS INDEX: 31.86 KG/M2 | WEIGHT: 186.6 LBS | RESPIRATION RATE: 18 BRPM | HEART RATE: 78 BPM

## 2025-01-24 DIAGNOSIS — Z11.3 SCREENING FOR VENEREAL DISEASE: Primary | ICD-10-CM

## 2025-01-24 RX ORDER — METRONIDAZOLE 500 MG/1
500 TABLET ORAL 2 TIMES DAILY
Qty: 14 TABLET | Refills: 0 | Status: SHIPPED | OUTPATIENT
Start: 2025-01-24 | End: 2025-01-31

## 2025-01-24 NOTE — PROGRESS NOTES
OB/GYN Problem VIsit    HPI  Snehal Gray is a No obstetric history on file.,  25 y.o. female who presents for a problem visit.  Partner apparently tested positive for trichomonas.  Patient is here for STD testing and to be treated.  She is currently completely asymptomatic.  She is not using the condoms consistently was encouraged to do so.    Patient's last menstrual period was 01/03/2025 (exact date).  Birth Control: condoms sometimes.  Last Pap: normal obtained 5/8/24.         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 No pertinent surgical history.  Social History     Occupational History    Not on file   Tobacco Use    Smoking status: Every Day     Types: E-Cigarettes    Smokeless tobacco: Never   Substance and Sexual Activity    Alcohol use: Yes    Drug use: Yes     Types: Marijuana (Weed)    Sexual activity: Yes     Partners: Male     Birth control/protection: None     Family History   Problem Relation Age of Onset    Diabetes Other         great grandmother    Diabetes Other         great grandfather    Sickle Cell Anemia Brother     Cancer Maternal Grandfather        No Known Allergies  Prior to Admission medications    Medication Sig Start Date End Date Taking? Authorizing Provider   metroNIDAZOLE (FLAGYL) 500 MG tablet Take 1 tablet by mouth 2 times daily for 7 days 1/24/25 1/31/25 Yes Karina Galindo MD        Review of Systems: History obtained from the patient  Constitutional: negative for weight loss, fever, night sweats  Breast: negative for breast lumps, nipple discharge, galactorrhea  GI: negative for change in bowel habits, abdominal pain, black or bloody stools  : negative for frequency, dysuria, hematuria, vaginal

## 2025-01-27 LAB — SPECIMEN STATUS REPORT: NORMAL

## 2025-01-28 LAB
C TRACH RRNA SPEC QL NAA+PROBE: NEGATIVE
N GONORRHOEA RRNA SPEC QL NAA+PROBE: NEGATIVE
T VAGINALIS RRNA SPEC QL NAA+PROBE: POSITIVE